# Patient Record
Sex: MALE
[De-identification: names, ages, dates, MRNs, and addresses within clinical notes are randomized per-mention and may not be internally consistent; named-entity substitution may affect disease eponyms.]

---

## 2018-08-13 ENCOUNTER — HOSPITAL ENCOUNTER (EMERGENCY)
Dept: HOSPITAL 33 - ED | Age: 63
End: 2018-08-13
Payer: COMMERCIAL

## 2018-08-13 VITALS — OXYGEN SATURATION: 84 %

## 2018-08-13 DIAGNOSIS — I46.9: Primary | ICD-10-CM

## 2018-08-13 PROCEDURE — 99284 EMERGENCY DEPT VISIT MOD MDM: CPT

## 2018-08-13 PROCEDURE — 99291 CRITICAL CARE FIRST HOUR: CPT

## 2018-08-13 PROCEDURE — 94799 UNLISTED PULMONARY SVC/PX: CPT

## 2018-08-13 NOTE — ERPHSYRPT
- History of Present Illness


Time Seen by Provider: 18 16:20


Source: EMS


Exam Limitations: clinical condition


Physician History: 





63 Y/O OBESE WHITE INCARCERATED MALE PRESENTS IN CARDIAC ARREST FROM THE long-term. 

PT FOUND DOWN IN HIS CELL. UNWITNESSED COLLAPSE. PER EMS, CPR BEGAN AT 1530 AND 

PT SHOCKED 14 TIMES PRIOR TO ARRIVAL TO EMS. UPON ARRIVAL OF EMS PT RECEIVED 6 

SHOCKS, 6 DOSES OF EPINEPHRINE AND 2 DOSES OF AMIODARONE. PT WAS NEVER 

RESPONSIVE DURING ENTIRE PROCESS. PER EMS, CARDIAC RHYTHM EN ROUTE WAS PEA OR V 

FIB. PT WAS INTUBATED ON SCENE. PT ARRIVES WITH ET TUBE IN PLACE, UNRESPONSIVE.





NO MEDICAL HX INCLUDING PMH, MEDS OR ALLERGIES AVAILABLE UPON ARRIVALTO ER


Timing/Duration: today


Activities at Onset: other (UNKNOWN)


Aspirin Treatment Today: 81 mg x 1





- Review of Systems


Skin: Other (ACUTE ABRASION RIGHT HAND)


All Other Systems: Reviewed and Negative





- Physical Exam


General Appearance: other (UNRESPONSIVE)


Eye Exam: other (BILAT PUPILS FIXED AND DILATED)


Neck Exam: other (MOTTLED NECK, PURPLE SKIN)


Respiratory Exam: other (NO SPONTANEOUS RESPIRATIONS)


Cardiovascular Exam: other (NO SPONTANEOUS HEART TONES OR PALPABLE PERIPHERAL 

PULSES)


Gastrointestinal/Abdomen Exam: soft, other (QUIET OBESE ABD)


Back Exam: normal inspection


Extremity Exam: other (ABRASION RIGHT HAND; IV LINE LEFT ANTECUBITAL FOSSA; 

RIGHT TIBIAL IO LINE)


Neurologic Exam: other (COMPLETELY UNRESPONSIVE)


Skin Exam: mottled


Lymphatic Exam: No adenopathy


**SpO2 Interpretation**: hypoxic, airway management int.


SpO2: 84





- Course


Nursing assessment & vital signs reviewed: Yes


Rhythm Strip: V-fib (VS PEA)





- Progress


Air Movement: poor


Progress Note: 





18 16:46


PT ARRIVES OROTRACHEALLY INTUBATED WITH FIXED DILATED PUPILS, NO SPONTANEOUS 

HEART TONES, NO SPONTANEOUS PALPABLE PERIPHERAL PULSES. HE IS UNRESPONSIVE. ALL 

THE ABOVE DESPITE CPR, 20 CARDIOVERSION SHOCKS, 6 EPI, 2 AMIODARONE INFUSIONS 

OVER A 41 MINUTES. PT WAS DOWN FOR AN UNKNOWN PERIOD OF TIME PRIOR TO BEING 

FOUND DOWN. 


WE CONTINUED CPR, GAVE 2 ROUNDS OF EPI, 1 AMP OF BICARB, AND 1MG OF MAGNESIUM. 

PTS CONDITION WAS UNCHANGED. CPR STOPPED AT 1628. PT WAS IN PROLONGED, 

UNCHANGING  PEA FOR AT LEAST 48 MINUTES


Blood Culture(s) Obtained: No


Antibiotics given: No





- Departure


Time of Disposition: 16:28


Departure Disposition: 


Clinical Impression: 


 Cardiac arrest, PEA (Pulseless electrical activity)





Condition: 


Critical Care Time: Yes


Critical Care Time(excluding separately billable procedures): 30-74 minutes